# Patient Record
Sex: FEMALE
[De-identification: names, ages, dates, MRNs, and addresses within clinical notes are randomized per-mention and may not be internally consistent; named-entity substitution may affect disease eponyms.]

---

## 2024-08-06 ENCOUNTER — APPOINTMENT (OUTPATIENT)
Dept: SURGERY | Facility: CLINIC | Age: 59
End: 2024-08-06

## 2024-08-06 PROBLEM — E66.01 OBESITY, MORBID: Status: ACTIVE | Noted: 2024-08-06

## 2024-08-06 PROBLEM — Z00.00 ENCOUNTER FOR PREVENTIVE HEALTH EXAMINATION: Status: ACTIVE | Noted: 2024-08-06

## 2024-08-06 PROBLEM — K43.9 HERNIA, EPIGASTRIC: Status: ACTIVE | Noted: 2024-08-06

## 2024-08-06 PROBLEM — Z82.49 FAMILY HISTORY OF ATRIAL FIBRILLATION: Status: ACTIVE | Noted: 2024-08-06

## 2024-08-06 PROBLEM — Z86.2 HISTORY OF ANEMIA: Status: RESOLVED | Noted: 2024-08-06 | Resolved: 2024-08-06

## 2024-08-06 PROBLEM — Z82.49 FAMILY HISTORY OF HYPERTENSION: Status: ACTIVE | Noted: 2024-08-06

## 2024-08-06 PROCEDURE — 99204 OFFICE O/P NEW MOD 45 MIN: CPT

## 2024-08-07 NOTE — PHYSICAL EXAM
[Normal Breath Sounds] : Normal breath sounds [No Rash or Lesion] : No rash or lesion [Alert] : alert [Calm] : calm [Abdomen Tenderness] : ~T ~M No abdominal tenderness [de-identified] : Obese, well developed, not in acute distress [de-identified] : +epigastric hernia felt upon coughing

## 2024-08-07 NOTE — HISTORY OF PRESENT ILLNESS
[de-identified] : Ms. 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 She presented today to discuss her weight gain and abdominal hernia. Patient states she has some residual reflux and discomfort from abdominal hernia. Denies pain with swallowing and does not have any vomiting or nausea with oral intake. Her symptoms have interfered with her ability to control/restrict her post-sleeve diet. Patient states she was 300lbs prior to sleeve with weight loss to 235 lbs, and today weighs 274lbs. Denies issues with passing flatus or bowel movements. She is seeking possible repair for hernia and management of obesity.

## 2024-08-07 NOTE — PHYSICAL EXAM
[Normal Breath Sounds] : Normal breath sounds [No Rash or Lesion] : No rash or lesion [Alert] : alert [Calm] : calm [Abdomen Tenderness] : ~T ~M No abdominal tenderness [de-identified] : Obese, well developed, not in acute distress [de-identified] : +epigastric hernia felt upon coughing

## 2024-08-07 NOTE — PLAN
[FreeTextEntry1] : Epigastric/Abdominal wall hernia s/p sleeve - Imaging:  CT scan abdomen and pelivis  - F/u lab work - F/u with me after the above is complete for further planning.  Morbid Obesity - Advised to try Ozempic  - Gastric bypass vs MDS  - Advised to attend overeaters anonymous  -F/u pre-op labs  - F/u UGI  - F/u EGD - 6 months of medical clearance medical visits (weights, vital signs) with PCP

## 2024-08-07 NOTE — ASSESSMENT
[FreeTextEntry1] :  59 year old Female with PMHx obesity (BMi 49) and PSHx of a laparoscopic sleeve gastrectomy a few years ago. She presented today to discuss her weight gain and abdominal hernia. Patient states she has some residual reflux and discomfort from abdominal hernia. Denies pain with swallowing and does not have any vomiting or nausea with oral intake. Her symptoms have interfered with her ability to control/restrict her post-sleeve diet. Patient states she was 300lbs prior to sleeve with weight loss to 235 lbs, and today weighs 274lbs. Denies issues with passing flatus or bowel movements. She is seeking possible repair for hernia and management of obesity.  Risks, benefits and alternatives have been discussed including but not limited to the risks of infection, bleeding, death, pulmonary embolism, DVT, leak, respiratory failure, GERD, dysphagia, anastomotic stricture, marginal ulcer, intestinal obstruction, internal hernia, malnutrition, vitamin deficiency, failure to lose weight, weight regain, injury to the surrounding anatomy, gallstone disease, and hernia formation.

## 2024-08-07 NOTE — HISTORY OF PRESENT ILLNESS
[de-identified] : Ms. 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 She presented today to discuss her weight gain and abdominal hernia. Patient states she has some residual reflux and discomfort from abdominal hernia. Denies pain with swallowing and does not have any vomiting or nausea with oral intake. Her symptoms have interfered with her ability to control/restrict her post-sleeve diet. Patient states she was 300lbs prior to sleeve with weight loss to 235 lbs, and today weighs 274lbs. Denies issues with passing flatus or bowel movements. She is seeking possible repair for hernia and management of obesity.

## 2024-08-12 ENCOUNTER — APPOINTMENT (OUTPATIENT)
Dept: BARIATRICS | Facility: CLINIC | Age: 59
End: 2024-08-12

## 2024-08-12 ENCOUNTER — NON-APPOINTMENT (OUTPATIENT)
Age: 59
End: 2024-08-12

## 2024-08-14 ENCOUNTER — APPOINTMENT (OUTPATIENT)
Dept: BARIATRICS | Facility: CLINIC | Age: 59
End: 2024-08-14
Payer: MEDICAID

## 2024-08-14 VITALS — BODY MASS INDEX: 49.16 KG/M2 | HEIGHT: 62.5 IN | WEIGHT: 274 LBS

## 2024-08-14 PROCEDURE — 98968 PH1 ASSMT&MGMT NQHP 21-30: CPT

## 2024-08-28 ENCOUNTER — APPOINTMENT (OUTPATIENT)
Dept: CT IMAGING | Facility: HOSPITAL | Age: 59
End: 2024-08-28

## 2024-09-04 ENCOUNTER — APPOINTMENT (OUTPATIENT)
Dept: RADIOLOGY | Facility: HOSPITAL | Age: 59
End: 2024-09-04

## 2024-09-16 ENCOUNTER — APPOINTMENT (OUTPATIENT)
Dept: BARIATRICS | Facility: CLINIC | Age: 59
End: 2024-09-16
Payer: MEDICAID

## 2024-09-16 VITALS — HEIGHT: 62.5 IN

## 2024-09-16 PROCEDURE — 98967 PH1 ASSMT&MGMT NQHP 11-20: CPT
